# Patient Record
Sex: MALE | Race: WHITE | NOT HISPANIC OR LATINO | URBAN - METROPOLITAN AREA
[De-identification: names, ages, dates, MRNs, and addresses within clinical notes are randomized per-mention and may not be internally consistent; named-entity substitution may affect disease eponyms.]

---

## 2019-12-19 ENCOUNTER — EMERGENCY (EMERGENCY)
Facility: HOSPITAL | Age: 49
LOS: 0 days | Discharge: HOME | End: 2019-12-19
Admitting: EMERGENCY MEDICINE
Payer: MEDICAID

## 2019-12-19 VITALS
DIASTOLIC BLOOD PRESSURE: 81 MMHG | HEART RATE: 64 BPM | RESPIRATION RATE: 18 BRPM | OXYGEN SATURATION: 97 % | TEMPERATURE: 98 F | SYSTOLIC BLOOD PRESSURE: 174 MMHG

## 2019-12-19 VITALS — WEIGHT: 229.94 LBS

## 2019-12-19 DIAGNOSIS — K08.89 OTHER SPECIFIED DISORDERS OF TEETH AND SUPPORTING STRUCTURES: ICD-10-CM

## 2019-12-19 PROCEDURE — 99283 EMERGENCY DEPT VISIT LOW MDM: CPT

## 2019-12-19 RX ORDER — IBUPROFEN 200 MG
800 TABLET ORAL ONCE
Refills: 0 | Status: DISCONTINUED | OUTPATIENT
Start: 2019-12-19 | End: 2019-12-19

## 2019-12-19 RX ORDER — IBUPROFEN 200 MG
1 TABLET ORAL
Qty: 30 | Refills: 0
Start: 2019-12-19 | End: 2019-12-28

## 2019-12-19 RX ORDER — AMOXICILLIN 250 MG/5ML
1 SUSPENSION, RECONSTITUTED, ORAL (ML) ORAL
Qty: 21 | Refills: 0
Start: 2019-12-19 | End: 2019-12-25

## 2019-12-19 RX ORDER — AMOXICILLIN 250 MG/5ML
500 SUSPENSION, RECONSTITUTED, ORAL (ML) ORAL ONCE
Refills: 0 | Status: DISCONTINUED | OUTPATIENT
Start: 2019-12-19 | End: 2019-12-19

## 2019-12-19 NOTE — ED PROVIDER NOTE - PATIENT PORTAL LINK FT
You can access the FollowMyHealth Patient Portal offered by [facility] by registering at the following website: [email or web address]. By joining [place]’s FollowMyHealth portal, you will also be able to view your health information using other applications (apps) compatible with our system.

## 2019-12-19 NOTE — ED PROVIDER NOTE - PHYSICAL EXAMINATION
CONST: Well appearing in NAD  EYES: PERRL, EOMI, Sclera and conjunctiva clear.   ENT: tenderness to tooth 14 with no swelling or abscess. No nasal discharge. Oropharynx normal appearing, no erythema or exudates. Uvula midline.   SKIN: Warm, dry, no acute rashes. Good turgor

## 2019-12-19 NOTE — ED PROVIDER NOTE - OBJECTIVE STATEMENT
49 year old male with pmhx noted, presents with left upper dental pain x few days. pt went to dentist 2 days ago and was told may need tooth extracted or root canal. no swelling or fever.

## 2021-02-07 ENCOUNTER — EMERGENCY (EMERGENCY)
Facility: HOSPITAL | Age: 51
LOS: 0 days | Discharge: HOME | End: 2021-02-07
Attending: EMERGENCY MEDICINE | Admitting: EMERGENCY MEDICINE
Payer: MEDICAID

## 2021-02-07 VITALS
TEMPERATURE: 101 F | DIASTOLIC BLOOD PRESSURE: 79 MMHG | SYSTOLIC BLOOD PRESSURE: 139 MMHG | OXYGEN SATURATION: 99 % | WEIGHT: 240.08 LBS | RESPIRATION RATE: 20 BRPM | HEART RATE: 99 BPM

## 2021-02-07 DIAGNOSIS — R51.9 HEADACHE, UNSPECIFIED: ICD-10-CM

## 2021-02-07 DIAGNOSIS — U07.1 COVID-19: ICD-10-CM

## 2021-02-07 DIAGNOSIS — I10 ESSENTIAL (PRIMARY) HYPERTENSION: ICD-10-CM

## 2021-02-07 PROCEDURE — 99284 EMERGENCY DEPT VISIT MOD MDM: CPT

## 2021-02-07 NOTE — ED ADULT NURSE NOTE - OBJECTIVE STATEMENT
pt c/o cough , tested positive COVID yesterday. pt states "My doctor told me to go to the hospital to get blood infusion or wait till Monday to get it at his office". pt denies hx, anemia, last blood work was done 2 month ago by his doctor. pt denies chest pain or SOB. pt is unsure why he was told to get infusion

## 2021-02-07 NOTE — ED ADULT NURSE NOTE - NSIMPLEMENTINTERV_GEN_ALL_ED
Implemented All Universal Safety Interventions:  [place] to call system. Call bell, personal items and telephone within reach. Instruct patient to call for assistance. Room bathroom lighting operational. Non-slip footwear when patient is off stretcher. Physically safe environment: no spills, clutter or unnecessary equipment. Stretcher in lowest position, wheels locked, appropriate side rails in place.

## 2021-02-07 NOTE — ED PROVIDER NOTE - OBJECTIVE STATEMENT
The pt is a 50y M w/ hx of HTN sent in by Dr. Goff for covid monoclonal antibody infusion. Pt endorses mild headache, intermittent cough, generalized body aches, chills x3 days and tested positive yesterday. Denies chest pain, SOB, N/V, abdominal pain, diarrhea.

## 2021-02-07 NOTE — ED PROVIDER NOTE - NS ED ROS FT
Constitutional: (+) subjective fever  Eyes/ENT: (-) blurry vision, (-) epistaxis  Cardiovascular: (-) chest pain, (-) syncope  Respiratory: (+) cough, (-) shortness of breath  Gastrointestinal: (-) vomiting, (-) diarrhea  Musculoskeletal: (-) neck pain, (-) back pain, (-) joint pain  Integumentary: (-) rash, (-) edema  Neurological: (+) headache, (-) altered mental status  Psychiatric: (-) hallucinations  Allergic/Immunologic: (-) pruritus

## 2021-02-07 NOTE — ED ADULT TRIAGE NOTE - CHIEF COMPLAINT QUOTE
pt c/o cough , tested positive COVID yesterday. pt states "My doctor told me to go to the hospital to get blood infusion or wait till Monday to get it at his office". pt denies hx, anemia, last blood work was done 2 month ago by his doctor. pt denies chest pain or SOB

## 2021-02-07 NOTE — ED PROVIDER NOTE - PATIENT PORTAL LINK FT
You can access the FollowMyHealth Patient Portal offered by [facility] by registering at the following website: [email or web address]. By joining [facility]’s FollowMyHealth portal, you will also be able to view your health information using other applications (apps) compatible with our system.

## 2021-02-07 NOTE — ED PROVIDER NOTE - CLINICAL SUMMARY MEDICAL DECISION MAKING FREE TEXT BOX
Covid. No respiratory distress. No hypoxia.  A/P: Close pulse-ox monitoring, isolation at home. Will refer for possible monoclonal Ab infusion. Will f/u with PMALONDRA Goff.

## 2021-02-07 NOTE — ED PROVIDER NOTE - CARE PROVIDER_API CALL
NGUYEN AGUILLON  Cardiothoracic Surgery  [address] [id number]  Phone: [phone]  Fax: [phone]  Follow Up Time: Routine

## 2022-10-17 NOTE — ED PROVIDER NOTE - NSTIMEPROVIDERCAREINITIATE_GEN_ER
07-Feb-2021 14:49 Odomzo Counseling- I discussed with the patient the risks of Odomzo including but not limited to nausea, vomiting, diarrhea, constipation, weight loss, changes in the sense of taste, decreased appetite, muscle spasms, and hair loss.  The patient verbalized understanding of the proper use and possible adverse effects of Odomzo.  All of the patient's questions and concerns were addressed.
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
